# Patient Record
Sex: FEMALE | Race: WHITE | HISPANIC OR LATINO | Employment: FULL TIME | URBAN - METROPOLITAN AREA
[De-identification: names, ages, dates, MRNs, and addresses within clinical notes are randomized per-mention and may not be internally consistent; named-entity substitution may affect disease eponyms.]

---

## 2024-06-05 ENCOUNTER — OFFICE VISIT (OUTPATIENT)
Dept: ORTHOPEDIC SURGERY | Facility: CLINIC | Age: 34
End: 2024-06-05

## 2024-06-05 VITALS
SYSTOLIC BLOOD PRESSURE: 106 MMHG | WEIGHT: 89 LBS | BODY MASS INDEX: 16.38 KG/M2 | HEIGHT: 62 IN | DIASTOLIC BLOOD PRESSURE: 66 MMHG

## 2024-06-05 DIAGNOSIS — S61.219A LACERATION OF FINGER OF RIGHT HAND, INITIAL ENCOUNTER: Primary | ICD-10-CM

## 2024-06-05 NOTE — PROGRESS NOTES
[facility]opedic     Office Visit       Date: 06/05/2024   Patient Name: Kesha Perez  MRN: [id number]   YOB: 1990    Referring Physician: Bhavik Ortega MD     Chief Complaint:   Chief Complaint   Patient presents with    Right Hand - Pain     Laceration\,  DOI 5/16/24       History of Present Illness:   Kesha Perez is a 33 y.o. female right-hand-dominant here for evaluation of right hand laceration.  Patient reports that she sustained a laceration to the first webspace of her right hand on May 16 while moving metal parts at work.  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 Had the laceration sutured up at the time of injury.  She denies numbness or tingling or loss of flexion extension of the thumb or finger.  She denies erythema or drainage.  She is otherwise healthy.  She denies smoking.      Subjective   Review of Systems:   Review of Systems   Constitutional:  Negative for chills, fever, unexpected weight gain and unexpected weight loss.   HENT:  Negative for congestion, postnasal drip and rhinorrhea.    Eyes:  Negative for blurred vision.   Respiratory:  Negative for shortness of breath.    Cardiovascular:  Negative for leg swelling.   Gastrointestinal:  Negative for abdominal pain, nausea and vomiting.   Genitourinary:  Negative for difficulty urinating.   Musculoskeletal:  Positive for arthralgias. Negative for gait problem, joint swelling and myalgias.   Skin:  Negative for skin lesions and wound.   Neurological:  Negative for dizziness, weakness, light-headedness and numbness.   Hematological:  Does not bruise/bleed easily.   Psychiatric/Behavioral:  Negative for depressed mood.         Pertinent review of systems per HPI.     I reviewed the patient's chief complaint, history of present illness, review of systems, past medical history, surgical history, family history, social history, medications and allergy list  "in the EMR on 06/05/2024 and agree with the findings above.    Objective    Vital Signs:   Vitals:    06/05/24 1512   BP: 106/66   Weight: 40.4 kg (89 lb)   Height: 157.5 cm (62\")     BMI: Body mass index is 16.28 kg/m².    General Appearance: No acute distress. Alert and oriented.     Chest:  Non-labored breathing on room air. Regular rate and rhythm.    Upper Extremity Exam:    4 cm laceration to the right first webspace that is healing appropriately.  Sensation is intact in both the thumb and index finger.  FPL thumb adductor and FDI all intact.  EPL intact.    Fingers are warm, well-perfused with appropriate capillary refill.  Palpable radial pulse.    Sensation intact to light touch in median, radial and ulnar nerve distributions.    Motor- Fires FPL, ulnar intrinsics, EPL/EDC w/ full active and passive range of motion. Strength intact.    Non-tender except for in the areas highlighted    Imaging/Studies:   Imaging Results (Last 24 Hours)       Procedure Component Value Units Date/Time    XR Hand 3+ View Right [[id number]] Resulted: 06/05/24 1537     Updated: 06/05/24 1544    Narrative:      Right Hand X-Ray    Indication: Pain    Views:  AP, Lateral, and Oblique     Comparison:  none    Findings:  No fracture  No bony lesion  Normal soft tissues  Normal joint spaces    Impression:   No bony abnormality right hand                  Procedures:  Procedures    Quality Measures:   ACP:   ACP discussion was deferred.    Tobacco:   Kesha Perez  reports that she has never smoked. She has never been exposed to tobacco smoke. She has never used smokeless tobacco.      Assessment / Plan    Assessment/Plan:     There are no diagnoses linked to this encounter.     Kesha Perezis a 33 y.o. female who presents with:      ICD-10-CM ICD-9-CM   1. Laceration of finger of right hand, initial encounter  S61.219A 883.0         Patient presents for evaluation of right first webspace laceration sustained over 2 and half " weeks ago.  No evidence of injury to underlying digital neurovascular bundle tendon.  Patient does have some weakness in the right hand she has not been using it since the injury.  Otherwise her laceration is healing well.  Her last suture was removed today.  I put in a referral for hand therapy to begin on active and passive range of motion of the right hand with  strengthening.  Recommend patient follow-up with me as needed with any concerns.    Follow Up:   Return if symptoms worsen or fail to improve.        Dave Hdez MD  [facility] [place] Hand and Upper Extremity Surgeon